# Patient Record
Sex: FEMALE | Race: WHITE | NOT HISPANIC OR LATINO | Employment: OTHER | URBAN - METROPOLITAN AREA
[De-identification: names, ages, dates, MRNs, and addresses within clinical notes are randomized per-mention and may not be internally consistent; named-entity substitution may affect disease eponyms.]

---

## 2018-10-27 PROBLEM — E87.1 HYPONATREMIA: Status: ACTIVE | Noted: 2018-10-27

## 2018-10-29 PROBLEM — I10 ESSENTIAL HYPERTENSION: Status: ACTIVE | Noted: 2018-10-29

## 2018-10-29 PROBLEM — I48.0 PAF (PAROXYSMAL ATRIAL FIBRILLATION): Status: ACTIVE | Noted: 2018-10-29

## 2019-02-09 PROBLEM — I48.92 ATRIAL FLUTTER, PAROXYSMAL: Status: ACTIVE | Noted: 2019-02-09

## 2019-02-09 PROBLEM — I48.0 PAROXYSMAL ATRIAL FIBRILLATION: Status: ACTIVE | Noted: 2019-02-09

## 2020-03-08 PROBLEM — R07.89 OTHER CHEST PAIN: Status: ACTIVE | Noted: 2020-03-08

## 2020-03-08 PROBLEM — R11.10 VOMITING: Status: ACTIVE | Noted: 2020-03-08

## 2020-03-08 PROBLEM — M54.2 NECK PAIN: Status: ACTIVE | Noted: 2020-03-08
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
## 2020-03-08 PROBLEM — R94.31 EKG ABNORMALITIES: Status: ACTIVE | Noted: 2020-03-08

## 2020-03-08 PROBLEM — R07.9 CHEST PAIN: Status: ACTIVE | Noted: 2020-03-08

## 2020-03-10 PROBLEM — I16.0 HYPERTENSIVE URGENCY: Status: ACTIVE | Noted: 2018-10-29

## 2020-03-10 PROBLEM — R94.31 EKG ABNORMALITIES: Status: RESOLVED | Noted: 2020-03-08 | Resolved: 2020-03-10

## 2020-08-09 PROBLEM — I10 HTN (HYPERTENSION), MALIGNANT: Status: ACTIVE | Noted: 2020-08-09

## 2020-08-09 PROBLEM — I48.0 PAROXYSMAL ATRIAL FIBRILLATION: Status: ACTIVE | Noted: 2020-08-09

## 2020-08-10 PROBLEM — R63.6 UNDERWEIGHT: Status: ACTIVE | Noted: 2020-08-10

## 2022-04-18 PROBLEM — I21.4 NSTEMI (NON-ST ELEVATED MYOCARDIAL INFARCTION): Status: ACTIVE | Noted: 2022-04-18

## 2022-04-18 PROBLEM — I50.31 HEART FAILURE, ACUTE DIASTOLIC: Status: ACTIVE | Noted: 2022-04-18

## 2022-04-19 PROBLEM — E63.8 INADEQUATE DIETARY INTAKE OF PROTEIN: Status: ACTIVE | Noted: 2022-04-19

## 2022-04-19 PROBLEM — I50.43 ACUTE ON CHRONIC COMBINED SYSTOLIC AND DIASTOLIC HEART FAILURE: Status: ACTIVE | Noted: 2022-04-18

## 2022-04-20 PROBLEM — R07.89 CHEST DISCOMFORT: Status: RESOLVED | Noted: 2020-03-08 | Resolved: 2022-04-20

## 2022-04-21 PROBLEM — I10 HTN (HYPERTENSION), MALIGNANT: Chronic | Status: ACTIVE | Noted: 2020-08-09

## 2022-04-25 PROBLEM — I50.9 ACUTE EXACERBATION OF CONGESTIVE HEART FAILURE: Status: ACTIVE | Noted: 2022-04-25

## 2022-04-25 PROBLEM — I50.31 HEART FAILURE, ACUTE DIASTOLIC: Status: ACTIVE | Noted: 2022-04-25

## 2022-04-25 PROBLEM — I25.10 CAD (CORONARY ARTERY DISEASE): Status: ACTIVE | Noted: 2022-04-25

## 2022-04-26 PROBLEM — I73.9 PAD (PERIPHERAL ARTERY DISEASE): Status: ACTIVE | Noted: 2022-04-26

## 2022-04-27 PROBLEM — E87.6 HYPOKALEMIA: Status: ACTIVE | Noted: 2022-04-27

## 2022-07-14 PROBLEM — F55.2 LAXATIVE ABUSE: Status: ACTIVE | Noted: 2022-07-14

## 2022-07-14 PROBLEM — I95.9 HYPOTENSION: Status: ACTIVE | Noted: 2022-07-14

## 2022-07-14 PROBLEM — N17.9 AKI (ACUTE KIDNEY INJURY): Status: ACTIVE | Noted: 2022-07-14

## 2022-07-14 PROBLEM — I50.43 ACUTE ON CHRONIC COMBINED SYSTOLIC AND DIASTOLIC HEART FAILURE: Status: RESOLVED | Noted: 2022-04-18 | Resolved: 2022-07-14

## 2022-07-14 PROBLEM — I50.32 CHRONIC DIASTOLIC HEART FAILURE: Status: ACTIVE | Noted: 2022-07-14

## 2022-10-24 ENCOUNTER — HOSPITAL ENCOUNTER (EMERGENCY)
Facility: HOSPITAL | Age: 87
Discharge: HOME | End: 2022-10-25
Attending: EMERGENCY MEDICINE
Payer: MEDICARE

## 2022-10-24 VITALS
BODY MASS INDEX: 17.04 KG/M2 | DIASTOLIC BLOOD PRESSURE: 73 MMHG | HEART RATE: 129 BPM | SYSTOLIC BLOOD PRESSURE: 154 MMHG | OXYGEN SATURATION: 99 % | HEIGHT: 66 IN | RESPIRATION RATE: 18 BRPM | TEMPERATURE: 98 F
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
## 2022-10-24 DIAGNOSIS — S00.83XA CONTUSION OF FACE, INITIAL ENCOUNTER: Primary | ICD-10-CM

## 2022-10-24 LAB
ALBUMIN SERPL BCP-MCNC: 4 G/DL (ref 3.5–5.2)
ALP SERPL-CCNC: 74 U/L (ref 55–135)
ALT SERPL W/O P-5'-P-CCNC: 12 U/L (ref 10–44)
ANION GAP SERPL CALC-SCNC: 14 MMOL/L (ref 8–16)
APTT BLDCRRT: 27.3 SEC (ref 21–32)
AST SERPL-CCNC: 21 U/L (ref 10–40)
BASOPHILS # BLD AUTO: 0.03 K/UL (ref 0–0.2)
BASOPHILS NFR BLD: 0.3 % (ref 0–1.9)
BILIRUB SERPL-MCNC: 0.7 MG/DL (ref 0.1–1)
BUN SERPL-MCNC: 39 MG/DL (ref 10–30)
CALCIUM SERPL-MCNC: 9.7 MG/DL (ref 8.7–10.5)
CHLORIDE SERPL-SCNC: 91 MMOL/L (ref 95–110)
CO2 SERPL-SCNC: 29 MMOL/L (ref 23–29)
CREAT SERPL-MCNC: 1.4 MG/DL (ref 0.5–1.4)
DIFFERENTIAL METHOD: ABNORMAL
EOSINOPHIL # BLD AUTO: 0 K/UL (ref 0–0.5)
EOSINOPHIL NFR BLD: 0.1 % (ref 0–8)
ERYTHROCYTE [DISTWIDTH] IN BLOOD BY AUTOMATED COUNT: 13.4 % (ref 11.5–14.5)
EST. GFR  (NO RACE VARIABLE): 35 ML/MIN/1.73 M^2
GLUCOSE SERPL-MCNC: 100 MG/DL (ref 70–110)
HCT VFR BLD AUTO: 34.1 % (ref 37–48.5)
HGB BLD-MCNC: 11.4 G/DL (ref 12–16)
IMM GRANULOCYTES # BLD AUTO: 0.02 K/UL (ref 0–0.04)
IMM GRANULOCYTES NFR BLD AUTO: 0.2 % (ref 0–0.5)
INR PPP: 1.1 (ref 0.8–1.2)
LYMPHOCYTES # BLD AUTO: 2.2 K/UL (ref 1–4.8)
LYMPHOCYTES NFR BLD: 18.8 % (ref 18–48)
MCH RBC QN AUTO: 27.9 PG (ref 27–31)
MCHC RBC AUTO-ENTMCNC: 33.4 G/DL (ref 32–36)
MCV RBC AUTO: 84 FL (ref 82–98)
MONOCYTES # BLD AUTO: 1.2 K/UL (ref 0.3–1)
MONOCYTES NFR BLD: 10.5 % (ref 4–15)
NEUTROPHILS # BLD AUTO: 8.1 K/UL (ref 1.8–7.7)
NEUTROPHILS NFR BLD: 70.1 % (ref 38–73)
NRBC BLD-RTO: 0 /100 WBC
PLATELET # BLD AUTO: 337 K/UL (ref 150–450)
PMV BLD AUTO: 8.8 FL (ref 9.2–12.9)
POTASSIUM SERPL-SCNC: 3.5 MMOL/L (ref 3.5–5.1)
PROT SERPL-MCNC: 7.4 G/DL (ref 6–8.4)
PROTHROMBIN TIME: 11.9 SEC (ref 9–12.5)
RBC # BLD AUTO: 4.08 M/UL (ref 4–5.4)
SODIUM SERPL-SCNC: 134 MMOL/L (ref 136–145)
TROPONIN I SERPL DL<=0.01 NG/ML-MCNC: 0.06 NG/ML (ref 0–0.03)
WBC # BLD AUTO: 11.52 K/UL (ref 3.9–12.7)

## 2022-10-24 PROCEDURE — 85730 THROMBOPLASTIN TIME PARTIAL: CPT | Performed by: NURSE PRACTITIONER

## 2022-10-24 PROCEDURE — 99285 EMERGENCY DEPT VISIT HI MDM: CPT | Mod: 25

## 2022-10-24 PROCEDURE — 93010 EKG 12-LEAD: ICD-10-PCS | Mod: ,,, | Performed by: INTERNAL MEDICINE

## 2022-10-24 PROCEDURE — 81003 URINALYSIS AUTO W/O SCOPE: CPT | Performed by: NURSE PRACTITIONER

## 2022-10-24 PROCEDURE — 63600175 PHARM REV CODE 636 W HCPCS: Performed by: EMERGENCY MEDICINE

## 2022-10-24 PROCEDURE — 93005 ELECTROCARDIOGRAM TRACING: CPT | Mod: 59

## 2022-10-24 PROCEDURE — 80053 COMPREHEN METABOLIC PANEL: CPT | Performed by: NURSE PRACTITIONER

## 2022-10-24 PROCEDURE — 85025 COMPLETE CBC W/AUTO DIFF WBC: CPT | Performed by: NURSE PRACTITIONER

## 2022-10-24 PROCEDURE — 51702 INSERT TEMP BLADDER CATH: CPT

## 2022-10-24 PROCEDURE — 93010 ELECTROCARDIOGRAM REPORT: CPT | Mod: ,,, | Performed by: INTERNAL MEDICINE

## 2022-10-24 PROCEDURE — 84484 ASSAY OF TROPONIN QUANT: CPT | Performed by: EMERGENCY MEDICINE

## 2022-10-24 PROCEDURE — 85610 PROTHROMBIN TIME: CPT | Performed by: NURSE PRACTITIONER

## 2022-10-24 RX ORDER — HYDRALAZINE HYDROCHLORIDE 20 MG/ML
10 INJECTION INTRAMUSCULAR; INTRAVENOUS
Status: COMPLETED | OUTPATIENT
Start: 2022-10-24 | End: 2022-10-24

## 2022-10-24 RX ADMIN — HYDRALAZINE HYDROCHLORIDE 10 MG: 20 INJECTION, SOLUTION INTRAMUSCULAR; INTRAVENOUS at 10:10

## 2022-10-24 NOTE — Clinical Note
Rekha Mclaughlin accompanied their grandmother to the emergency department on 10/24/2022. They may return to work on 10/26/2022.      If you have any questions or concerns, please don't hesitate to call.      Arleen SCRUGGS

## 2022-10-25 LAB
BILIRUB UR QL STRIP: NEGATIVE
CLARITY UR: CLEAR
COLOR UR: COLORLESS
GLUCOSE UR QL STRIP: NEGATIVE
HGB UR QL STRIP: NEGATIVE
KETONES UR QL STRIP: NEGATIVE
LEUKOCYTE ESTERASE UR QL STRIP: NEGATIVE
NITRITE UR QL STRIP: NEGATIVE
PH UR STRIP: 6 [PH] (ref 5–8)
PROT UR QL STRIP: NEGATIVE
SP GR UR STRIP: 1 (ref 1–1.03)
URN SPEC COLLECT METH UR: ABNORMAL
UROBILINOGEN UR STRIP-ACNC: NEGATIVE EU/DL

## 2022-10-25 NOTE — ED PROVIDER NOTES
SCRIBE #1 NOTE: I, Chao Micheline, am scribing for, and in the presence of, Hank Corral Jr., MD. I have scribed the entire note.       History     Chief Complaint   Patient presents with    Eye Injury     Pt was found with bruised eye by family member, unsure if pt fell, pt is unable to tell what happened. Takes blood thinners.     Review of patient's allergies indicates:  No Known Allergies      History of Present Illness     HPI    10/24/2022, 9:22 PM  History obtained from the granddaughter      History of Present Illness: Zenaida Frazier is a 89 y.o. female patient with a PMHx of HTN, a-fib, CAD, vertigo who presents to the Emergency Department for evaluation of a left eye injury which onset gradually today. Pt is a poor historians and unable to recall if there was a LOC. Pt's granddaughter states the pt was found in the granddaughter's bed at the granddaughter's house with bruising and swelling to her left eye. The granddaughter states the pt has a walker but does not use it and has been having increased loss of balance that causes episodic falls recently. The granddaughter reports she is unable to determine the source of the eye injury after reviewing camera footage within her house. The historian notes the pt takes 3 blood thinners. Symptoms are constant and moderate in severity. No mitigating or exacerbating factors reported. Patient's granddaughter denies any fever, chills, SOB, vomiting, diarrhea, hematuria, and all other sxs at this time. No prior Tx reported. No further complaints or concerns at this time.       Arrival mode: Personal vehicle    PCP: Junaid Moe MD        Past Medical History:  Past Medical History:   Diagnosis Date    A-fib     Anxiety and depression     Arthritis     RA    Atrial fibrillation     Atrial flutter     Chest discomfort 03/08/2020    Coronary artery disease     Glaucoma     Hemorrhoids     Hypercholesteremia     Hypertension     Lumbar disc disease     Macular  degeneration     Osteoporosis     Peripheral neuropathy     PVD (peripheral vascular disease)     Vertigo        Past Surgical History:  Past Surgical History:   Procedure Laterality Date    BREAST SURGERY      BENIGN TUMORS EXCISED    CATARACT EXTRACTION Bilateral     femoral vein surgery      LEFT HEART CATHETERIZATION Left 4/19/2022    Procedure: Left heart cath;  Surgeon: Richmond Wren MD;  Location: [facility] CATH;  Service: Cardiovascular;  Laterality: Left;    LEFT HEART CATHETERIZATION Left 4/25/2022    Procedure: Left heart cath;  Surgeon: Griffin Ervin MD;  Location: [facility] CATH;  Service: Cardiology;  Laterality: Left;    LEFT HEART CATHETERIZATION Left 5/2/2022    Procedure: Left heart cath;  Surgeon: Griffin Ervin MD;  Location: [facility] CATH;  Service: Cardiology;  Laterality: Left;    PERCUTANEOUS TRANSLUMINAL ANGIOPLASTY (PTA) OF PERIPHERAL VESSEL N/A 4/28/2022    Procedure: PTA, PERIPHERAL VESSEL;  Surgeon: Octavio Ervin MD;  Location: [facility] CATH;  Service: Cardiology;  Laterality: N/A;    TREATMENT OF CARDIAC ARRHYTHMIA N/A 7/20/2022    Procedure: Cardioversion or Defibrillation;  Surgeon: Guy Augustine MD;  Location: [facility] CATH;  Service: Cardiology;  Laterality: N/A;         Family History:  Family History   Problem Relation Age of Onset    Cancer Mother         CERVICAL/UTERINE    Heart disease Daughter     Heart disease Daughter        Social History:  Social History     Tobacco Use    Smoking status: Never    Smokeless tobacco: Never   Substance and Sexual Activity    Alcohol use: No    Drug use: No    Sexual activity: Never        Review of Systems     Review of Systems   Constitutional:  Negative for fever.   HENT:  Positive for facial swelling (to left eye w/ bruising). Negative for sore throat.    Respiratory:  Negative for shortness of breath.    Cardiovascular:  Negative for chest pain.   Gastrointestinal:  Negative for nausea.   Genitourinary:  Negative for dysuria.   Musculoskeletal:  Negative  "for back pain.   Skin:  Negative for rash.   Neurological:  Negative for weakness.        (+) Increased loss of balance   Hematological:  Does not bruise/bleed easily.   All other systems reviewed and are negative.     Physical Exam     Initial Vitals [10/24/22 1901]   BP Pulse Resp Temp SpO2   (!) 193/98 106 20 98.2 °F (36.8 °C) 100 %      MAP       --          Physical Exam   GEN:  Alert and oriented to person place and time.  No acute distress.  HEENT:  Normocephalic . Extraocular muscles intact bilaterally. No evidence of entrapment.  No nasal deformity.  Nasal septum is midline.  There is no septal hematoma.  Tympanic membranes are normal. No hemotympanum.  Negative Deluna sign. No CSF leak.  There is periorbital bruising to the left face.  CV:.  Regular rate and rhythm without gallops murmurs or rubs. 2+ pulses bilateral upper and lower extremities.  PULM:  Clear to auscultation bilaterally. No respiratory distress    Gi:  Soft nontender nondistended with normoactive bowel sounds  :.  No CVA tenderness. No suprapubic tenderness.  MS:  There is no point C/T/L/S tenderness. Normal spinal curvature.  Pelvis is stable nontender.  There is no chest wall tenderness.  Clavicles are nontender. All other bones have been palpated and joints ranged fully without tenderness or deformity.  NEURO:  II-XII intact bilaterally. No focal lateralizing signs.  SKIN:  Intact. No rash or laceration.  Bruising around the left orbit.       ED Course   Procedures  ED Vital Signs:  Vitals:    10/24/22 1901 10/24/22 2039 10/24/22 2040 10/24/22 2102   BP: (!) 193/98 (!) 216/102  (!) 209/98   Pulse: 106 103 103 103   Resp: 20   14   Temp: 98.2 °F (36.8 °C)      TempSrc: Oral      SpO2: 100% 100%  100%   Height: 5' 5.5" (1.664 m)       10/24/22 2250 10/24/22 2332   BP: (!) 211/103 (!) 154/73   Pulse:  (!) 129   Resp:  18   Temp:     TempSrc:     SpO2:  99%   Height:         Abnormal Lab Results:  Labs Reviewed   CBC W/ AUTO DIFFERENTIAL " - Abnormal; Notable for the following components:       Result Value    Hemoglobin 11.4 (*)     Hematocrit 34.1 (*)     MPV 8.8 (*)     Gran # (ANC) 8.1 (*)     Mono # 1.2 (*)     All other components within normal limits   COMPREHENSIVE METABOLIC PANEL - Abnormal; Notable for the following components:    Sodium 134 (*)     Chloride 91 (*)     BUN 39 (*)     eGFR 35 (*)     All other components within normal limits   URINALYSIS, REFLEX TO URINE CULTURE - Abnormal; Notable for the following components:    Color, UA Colorless (*)     All other components within normal limits    Narrative:     Specimen Source->Urine   TROPONIN I - Abnormal; Notable for the following components:    Troponin I 0.060 (*)     All other components within normal limits   PROTIME-INR   APTT        All Lab Results:  Results for orders placed or performed during the hospital encounter of 10/24/22   CBC auto differential   Result Value Ref Range    WBC 11.52 3.90 - 12.70 K/uL    RBC 4.08 4.00 - 5.40 M/uL    Hemoglobin 11.4 (L) 12.0 - 16.0 g/dL    Hematocrit 34.1 (L) 37.0 - 48.5 %    MCV 84 82 - 98 fL    MCH 27.9 27.0 - 31.0 pg    MCHC 33.4 32.0 - 36.0 g/dL    RDW 13.4 11.5 - 14.5 %    Platelets 337 150 - 450 K/uL    MPV 8.8 (L) 9.2 - 12.9 fL    Immature Granulocytes 0.2 0.0 - 0.5 %    Gran # (ANC) 8.1 (H) 1.8 - 7.7 K/uL    Immature Grans (Abs) 0.02 0.00 - 0.04 K/uL    Lymph # 2.2 1.0 - 4.8 K/uL    Mono # 1.2 (H) 0.3 - 1.0 K/uL    Eos # 0.0 0.0 - 0.5 K/uL    Baso # 0.03 0.00 - 0.20 K/uL    nRBC 0 0 /100 WBC    Gran % 70.1 38.0 - 73.0 %    Lymph % 18.8 18.0 - 48.0 %    Mono % 10.5 4.0 - 15.0 %    Eosinophil % 0.1 0.0 - 8.0 %    Basophil % 0.3 0.0 - 1.9 %    Differential Method Automated    Comprehensive metabolic panel   Result Value Ref Range    Sodium 134 (L) 136 - 145 mmol/L    Potassium 3.5 3.5 - 5.1 mmol/L    Chloride 91 (L) 95 - 110 mmol/L    CO2 29 23 - 29 mmol/L    Glucose 100 70 - 110 mg/dL    BUN 39 (H) 10 - 30 mg/dL    Creatinine 1.4  0.5 - 1.4 mg/dL    Calcium 9.7 8.7 - 10.5 mg/dL    Total Protein 7.4 6.0 - 8.4 g/dL    Albumin 4.0 3.5 - 5.2 g/dL    Total Bilirubin 0.7 0.1 - 1.0 mg/dL    Alkaline Phosphatase 74 55 - 135 U/L    AST 21 10 - 40 U/L    ALT 12 10 - 44 U/L    Anion Gap 14 8 - 16 mmol/L    eGFR 35 (A) >60 mL/min/1.73 m^2   Urinalysis, Reflex to Urine Culture Urine, Catheterized    Specimen: Urine   Result Value Ref Range    Specimen UA Urine, Catheterized     Color, UA Colorless (A) Yellow, Straw, Sissy    Appearance, UA Clear Clear    pH, UA 6.0 5.0 - 8.0    Specific Gravity, UA 1.005 1.005 - 1.030    Protein, UA Negative Negative    Glucose, UA Negative Negative    Ketones, UA Negative Negative    Bilirubin (UA) Negative Negative    Occult Blood UA Negative Negative    Nitrite, UA Negative Negative    Urobilinogen, UA Negative <2.0 EU/dL    Leukocytes, UA Negative Negative   Protime-INR   Result Value Ref Range    Prothrombin Time 11.9 9.0 - 12.5 sec    INR 1.1 0.8 - 1.2   APTT   Result Value Ref Range    aPTT 27.3 21.0 - 32.0 sec   Troponin I   Result Value Ref Range    Troponin I 0.060 (H) 0.000 - 0.026 ng/mL         Imaging Results:  Imaging Results              CT Head Without Contrast (Final result)  Result time 10/24/22 19:40:33      Final result by Julio César Camacho MD (10/24/22 19:40:33)                   Impression:      No acute intracranial CT abnormality.    Large left scalp hematoma.    All CT scans at this facility are performed  using dose modulation techniques as appropriate to performed exam including the following:  automated exposure control; adjustment of mA and/or kV according to the patients size (this includes techniques or standardized protocols for targeted exams where dose is matched to indication/reason for exam: i.e. extremities or head);  iterative reconstruction technique.      Electronically signed by: Julio César Camacho  Date:    10/24/2022  Time:    19:40               Narrative:    EXAMINATION:  CT HEAD  WITHOUT CONTRAST    CLINICAL HISTORY:  Head trauma, moderate-severe;    TECHNIQUE:  Low dose axial CT images obtained throughout the head without intravenous contrast. Sagittal and coronal reconstructions were performed.    COMPARISON:  Multiple priors.    FINDINGS:  Intracranial compartment:    Ventricles and sulci are enlarged in keeping with cerebral volume loss.  No hydrocephalus.  No extra-axial blood or fluid collections.    Moderate microvascular ischemic change.  No parenchymal mass, hemorrhage, edema or major vascular distribution infarct.    Skull/extracranial contents (limited evaluation): No calvarial fracture.  Large left scalp hematoma.  Mastoid air cells and paranasal sinuses are essentially clear.                                       CT Maxillofacial Without Contrast (Final result)  Result time 10/24/22 19:32:39      Final result by Julio César Camacho MD (10/24/22 19:32:39)                   Impression:      No evidence of an acute displaced fracture.    Large left periorbital and left scalp hematoma.    All CT scans at this facility are performed  using dose modulation techniques as appropriate to performed exam including the following:  automated exposure control; adjustment of mA and/or kV according to the patients size (this includes techniques or standardized protocols for targeted exams where dose is matched to indication/reason for exam: i.e. extremities or head);  iterative reconstruction technique.      Electronically signed by: Julio César Camacho  Date:    10/24/2022  Time:    19:32               Narrative:    EXAMINATION:  CT MAXILLOFACIAL WITHOUT CONTRAST    CLINICAL HISTORY:  Facial trauma, blunt;    TECHNIQUE:  Low dose CT images throughout the region of the facial bones.  Axial, sagittal and coronal reformations were obtained.  Contrast was not administered.    COMPARISON:  None    FINDINGS:  Large left periorbital and left scalp soft tissue swelling.  The globes and intraorbital contents  are within normal limits.  No orbital fracture.    The remainder of the facial bones appear intact without evidence of an acute displaced fracture.  No osseous destructive lesions.    Temporomandibular joints appropriately position without evidence of dislocation.    Multifocal periodontal disease with periapical root cysts.    Paranasal sinuses essentially clear.  Mastoids are clear.    Limited intracranial evaluation is unremarkable.                                       The EKG was ordered, reviewed, and independently interpreted by the ED provider.  Interpretation time: 21:00  Rate: 102 BPM  Rhythm: sinus tachycardia with 1st degree A-V block.  Interpretation: Septal infarct (cited on or before 18-APR-2022). Marked ST abnormality, possible inferior subendocardial injury. No STEMI.      The Emergency Provider reviewed the vital signs and test results, which are outlined above.     ED Discussion       12:29 AM: Reassessed pt at this time. Discussed with pt all pertinent ED information and results. Discussed pt dx and plan of tx. Gave pt all f/u and return to the ED instructions. All questions and concerns were addressed at this time. Pt expresses understanding of information and instructions, and is comfortable with plan to discharge. Pt is stable for discharge.    I discussed with patient and/or family/caretaker that evaluation in the ED does not suggest any emergent or life threatening medical conditions requiring immediate intervention beyond what was provided in the ED, and I believe patient is safe for discharge.  Regardless, an unremarkable evaluation in the ED does not preclude the development or presence of a serious of life threatening condition. As such, patient was instructed to return immediately for any worsening or change in current symptoms.    Patient is stable and nontoxic.  Patient has a fall with unknown syncopal event.  Patient is stable nontoxic workup is negative.  She has an elevated  troponin but appears baseline.  EKG is unchanged.  Workup is negative.  Patient is under 247 and care by her granddaughter.  She is stable safe for discharge my opinion.  Discussed with her all findings well as plan of care.  They verbalized understanding room with all instructions seems reliable.  She is safe for discharge my opinion.     Medical Decision Making:   Clinical Tests:   Lab Tests: Ordered and Reviewed  Radiological Study: Ordered and Reviewed  Medical Tests: Ordered and Reviewed         ED Medication(s):  Medications   hydrALAZINE injection 10 mg (10 mg Intravenous Given 10/24/22 0)       New Prescriptions    No medications on file        Follow-up Information       Junaid Moe MD.    Specialty: Hospitalist  Contact information:  [address] [facility] [id number]  [phone]                                 Scribe Attestation:   Scribe #1: I performed the above scribed service and the documentation accurately describes the services I performed. I attest to the accuracy of the note.     Attending:   Physician Attestation Statement for Scribe #1: I, Hank Corral Jr., MD, personally performed the services described in this documentation, as scribed by Chao Tobias, in my presence, and it is both accurate and complete.           Clinical Impression       ICD-10-CM ICD-9-CM   1. Contusion of face, initial encounter  S00.83XA 920   2. Fall  W19.XXXA E888.9       Disposition:   Disposition: Discharged  Condition: Stable       Hank Corral Jr., MD  10/25/22 0045

## 2022-10-25 NOTE — FIRST PROVIDER EVALUATION
Medical screening examination initiated.  I have conducted a focused provider triage encounter, findings are as follows:    Brief history of present illness:  Patient presents to ER for facial bruising/swelling. Family member unsure if patient fell. + anticoagulation therapy.    There were no vitals filed for this visit.    Pertinent physical exam:  + swelling/bruising to left side of face.    Brief workup plan:  imaging, labs, ED bed for further eval/tx.    Preliminary workup initiated; this workup will be continued and followed by the physician or advanced practice provider that is assigned to the patient when roomed.

## 2023-03-20 PROBLEM — R93.89 ABNORMAL CHEST X-RAY: Status: ACTIVE | Noted: 2023-01-01

## 2023-03-20 PROBLEM — R40.4 TRANSIENT ALTERATION OF AWARENESS: Status: ACTIVE | Noted: 2023-01-01

## 2023-03-20 PROBLEM — N30.00 ACUTE CYSTITIS WITHOUT HEMATURIA: Status: ACTIVE | Noted: 2023-01-01

## 2023-08-09 PROBLEM — S32.591A CLOSED FRACTURE OF MULTIPLE PUBIC RAMI, RIGHT, INITIAL ENCOUNTER: Status: ACTIVE | Noted: 2023-01-01

## 2023-08-09 PROBLEM — J18.9 MULTIFOCAL PNEUMONIA: Status: ACTIVE | Noted: 2023-01-01

## 2023-08-10 PROBLEM — J18.9 MULTIFOCAL PNEUMONIA: Status: RESOLVED | Noted: 2023-01-01 | Resolved: 2023-01-01
